# Patient Record
Sex: MALE
[De-identification: names, ages, dates, MRNs, and addresses within clinical notes are randomized per-mention and may not be internally consistent; named-entity substitution may affect disease eponyms.]

---

## 2020-09-11 ENCOUNTER — HOSPITAL ENCOUNTER (OUTPATIENT)
Dept: HOSPITAL 88 - MRI | Age: 40
End: 2020-09-11
Payer: COMMERCIAL

## 2020-09-11 DIAGNOSIS — M54.16: Primary | ICD-10-CM

## 2020-09-11 PROCEDURE — 72148 MRI LUMBAR SPINE W/O DYE: CPT

## 2020-09-11 NOTE — DIAGNOSTIC IMAGING REPORT
Examination: MRI SPINE LUMBAR WO CONTRAST



History: ^RADICULOPATHY; low back pain with right leg pain for one year.

Comparison studies: None



Technique: 

Sagittal, coronal  and axial T2 , sagittal T1 and STIR; axial  spin density

oblique.



Findings:



Number of lumbar vertebral bodies: Five.



Alignment: Normal lordosis. No scoliosis.

Soft tissues: No T2 hyperintense inflammatory changes.

Posterior paraspinal soft tissues and muscles: No abnormality. 

Lower thoracic cord: Normal in signal and morphology. The tip of the conus is

at T12-L1.

Cauda equina: No masses.  No arachnoiditis.



Vertebrae: 

No fractures, infection or neoplasm.



Degenerative changes:



L1-L2:

No abnormalities.



L2-L3:

Mild diffuse disc bulge and bilateral facet arthropathy result in mild right

and moderate left neural foraminal narrowing. 

No canal stenosis.



L3-L4:

Asymmetric to the left disc bulge and bilateral facet arthropathy result in

moderate left neural foraminal narrowing. 

No right foraminal or canal stenosis. 



L4-L5:

Right central disc extrusion with inferior migration up to the inferior

endplate of L5 superimposed on asymmetric to the right disc bulge results in

mild bilateral neural foraminal narrowing and moderate canal stenosis. Part of

the intensity of the extruded fragment is different from the parent disc and

could represent a partially sequestered disc and measures 2 cm in craniocaudal

dimension (series 3, image #6).

There is impingement of the exiting right L4 and descending right L5 nerve

roots.



L5-S1:

Small central disc protrusion. Bilateral facet arthropathy. No canal or

foraminal stenosis.



IMPRESSION: 



Degenerative changes from L2-L3 through L5-S1 with a right central disc

extrusion with inferior migration at L4-L5 with possible partially sequestered

disc as detailed above. This disc extrusion causes impingement of the exiting

right L4 and descending right L5 nerve roots and moderate canal stenosis. This

could be the cause of patient's right radicular symptoms.



Moderate left foraminal narrowing at L2-L3 and L3-L4.



Signed by: Dr. Merle Gutierrez M.D. on 9/11/2020 12:29 PM